# Patient Record
Sex: MALE | Employment: FULL TIME | URBAN - METROPOLITAN AREA
[De-identification: names, ages, dates, MRNs, and addresses within clinical notes are randomized per-mention and may not be internally consistent; named-entity substitution may affect disease eponyms.]

---

## 2024-07-22 ENCOUNTER — LAB (OUTPATIENT)
Dept: LAB | Facility: LAB | Age: 44
End: 2024-07-22
Payer: COMMERCIAL

## 2024-07-22 ENCOUNTER — APPOINTMENT (OUTPATIENT)
Dept: PRIMARY CARE | Facility: CLINIC | Age: 44
End: 2024-07-22
Payer: COMMERCIAL

## 2024-07-22 VITALS
BODY MASS INDEX: 28.28 KG/M2 | HEART RATE: 88 BPM | WEIGHT: 202 LBS | HEIGHT: 71 IN | SYSTOLIC BLOOD PRESSURE: 168 MMHG | RESPIRATION RATE: 18 BRPM | DIASTOLIC BLOOD PRESSURE: 110 MMHG | TEMPERATURE: 98.1 F

## 2024-07-22 DIAGNOSIS — I10 HTN (HYPERTENSION), BENIGN: Primary | ICD-10-CM

## 2024-07-22 DIAGNOSIS — I10 HTN (HYPERTENSION), BENIGN: ICD-10-CM

## 2024-07-22 DIAGNOSIS — R30.0 DYSURIA: ICD-10-CM

## 2024-07-22 LAB — TSH SERPL-ACNC: 1.99 MIU/L (ref 0.44–3.98)

## 2024-07-22 PROCEDURE — 99204 OFFICE O/P NEW MOD 45 MIN: CPT | Performed by: FAMILY MEDICINE

## 2024-07-22 PROCEDURE — 84443 ASSAY THYROID STIM HORMONE: CPT

## 2024-07-22 PROCEDURE — 1036F TOBACCO NON-USER: CPT | Performed by: FAMILY MEDICINE

## 2024-07-22 PROCEDURE — 3008F BODY MASS INDEX DOCD: CPT | Performed by: FAMILY MEDICINE

## 2024-07-22 PROCEDURE — 3080F DIAST BP >= 90 MM HG: CPT | Performed by: FAMILY MEDICINE

## 2024-07-22 PROCEDURE — 36415 COLL VENOUS BLD VENIPUNCTURE: CPT

## 2024-07-22 PROCEDURE — 3077F SYST BP >= 140 MM HG: CPT | Performed by: FAMILY MEDICINE

## 2024-07-22 PROCEDURE — 93000 ELECTROCARDIOGRAM COMPLETE: CPT | Performed by: FAMILY MEDICINE

## 2024-07-22 RX ORDER — FLUTICASONE PROPIONATE 50 MCG
1 SPRAY, SUSPENSION (ML) NASAL DAILY
COMMUNITY

## 2024-07-22 RX ORDER — METOPROLOL SUCCINATE 50 MG/1
50 TABLET, EXTENDED RELEASE ORAL DAILY
Qty: 30 TABLET | Refills: 11 | Status: SHIPPED | OUTPATIENT
Start: 2024-07-22

## 2024-07-22 RX ORDER — FEXOFENADINE HCL AND PSEUDOEPHEDRINE HCL 180; 240 MG/1; MG/1
1 TABLET, EXTENDED RELEASE ORAL DAILY
COMMUNITY

## 2024-07-22 NOTE — ASSESSMENT & PLAN NOTE
He is currently undergoing extensive workup for this through urology.  He has had multiple urinalyses and has not really shown signs of infection.  He had a urine cytology which was normal as well as a CT of the urinary system which was normal.  He is currently scheduled to have a cystoscopy with his urologist.

## 2024-07-22 NOTE — PROGRESS NOTES
Subjective   Nino Todd is a 43 y.o. male who presents for Establish Care and Hypertension.     HPI         Nino Todd is here for a hypertension follow-up:    Medication treatment:  not currently on medications for this problem  Recent BP readings:  mostly under 160/110  Symptoms: no cough, headache, blurred vision, chest pain, or shortness of breath, he does report intermittent dizziness.   He feels overheated often and likes the room cooler.  He sweats a lot and has lost weight unexpectedly recently. He does have palpitations especially at night.  One cup coffee daily  No energy drinks  No nicotine  Visit Vitals  BP (!) 168/110   Pulse 88   Temp 36.7 °C (98.1 °F)   Resp 18      Objective   Physical Exam  Constitutional:       Appearance: Normal appearance.   HENT:      Head: Normocephalic.   Eyes:      Conjunctiva/sclera: Conjunctivae normal.   Neck:      Thyroid: Thyromegaly (mild and symmetric) present.   Cardiovascular:      Rate and Rhythm: Normal rate and regular rhythm.      Heart sounds: Normal heart sounds.   Pulmonary:      Effort: Pulmonary effort is normal.      Breath sounds: Normal breath sounds.   Musculoskeletal:      Cervical back: Neck supple.   Skin:     General: Skin is warm and dry.   Neurological:      Mental Status: He is alert.            Assessment & Plan  HTN (hypertension), benign  This 43-year-old male has significantly elevated blood pressure with a diastolic that is significantly high as well.  He brings meticulous interval readings showing very consistently elevated blood pressure over the last week.  He is already had blood work done at another institution showing normal kidney function, blood counts.  He is not consuming any significant amount of caffeine and no nicotine.  He is not on any stimulating routine medications.  This point I think he does have the diagnosis of hypertension.  He has some significant symptoms of hyperthyroidism including a mildly prominent thyroid gland,  heat intolerance, sweats, and some palpitations, as well as weight loss.  We will check a thyroid screen to ensure that this is not part of the problem and go ahead and start him on a beta-blocker medication which would treat the symptoms as well as address the hypertension.  Orders:    TSH with reflex to Free T4 if abnormal; Future    ECG 12 Lead    metoprolol succinate XL (Toprol-XL) 50 mg 24 hr tablet; Take 1 tablet (50 mg) by mouth once daily. Do not crush or chew.    Follow Up In [facility] - PCP - Established; Future  Additionally he does describe some symptoms of feeling short attention span and a little hyper.  It is possible that he has undiagnosed attention deficit disorder but hyperthyroidism could also explain this and at any rate the beta-blocker may address the symptoms as well.  Dysuria  He is currently undergoing extensive workup for this through urology.  He has had multiple urinalyses and has not really shown signs of infection.  He had a urine cytology which was normal as well as a CT of the urinary system which was normal.  He is currently scheduled to have a cystoscopy with his urologist.              Please excuse any errors in grammar or translation related to this dictation. Voice recognition software was utilized to prepare this document.

## 2024-07-22 NOTE — ASSESSMENT & PLAN NOTE
This 43-year-old male has significantly elevated blood pressure with a diastolic that is significantly high as well.  He brings meticulous interval readings showing very consistently elevated blood pressure over the last week.  He is already had blood work done at another institution showing normal kidney function, blood counts.  He is not consuming any significant amount of caffeine and no nicotine.  He is not on any stimulating routine medications.  This point I think he does have the diagnosis of hypertension.  He has some significant symptoms of hyperthyroidism including a mildly prominent thyroid gland, heat intolerance, sweats, and some palpitations, as well as weight loss.  We will check a thyroid screen to ensure that this is not part of the problem and go ahead and start him on a beta-blocker medication which would treat the symptoms as well as address the hypertension.  Orders:    TSH with reflex to Free T4 if abnormal; Future    ECG 12 Lead    metoprolol succinate XL (Toprol-XL) 50 mg 24 hr tablet; Take 1 tablet (50 mg) by mouth once daily. Do not crush or chew.    Follow Up [facility] - PCP - Established; Future  Additionally he does describe some symptoms of feeling short attention span and a little hyper.  It is possible that he has undiagnosed attention deficit disorder but hyperthyroidism could also explain this and at any rate the beta-blocker may address the symptoms as well.

## 2024-08-26 ENCOUNTER — APPOINTMENT (OUTPATIENT)
Dept: PRIMARY CARE | Facility: CLINIC | Age: 44
End: 2024-08-26
Payer: COMMERCIAL

## 2024-08-26 VITALS
TEMPERATURE: 97.9 F | HEIGHT: 71 IN | HEART RATE: 64 BPM | WEIGHT: 212 LBS | DIASTOLIC BLOOD PRESSURE: 96 MMHG | SYSTOLIC BLOOD PRESSURE: 139 MMHG | BODY MASS INDEX: 29.68 KG/M2 | RESPIRATION RATE: 16 BRPM

## 2024-08-26 DIAGNOSIS — I10 HTN (HYPERTENSION), BENIGN: ICD-10-CM

## 2024-08-26 PROCEDURE — 3080F DIAST BP >= 90 MM HG: CPT | Performed by: FAMILY MEDICINE

## 2024-08-26 PROCEDURE — 1036F TOBACCO NON-USER: CPT | Performed by: FAMILY MEDICINE

## 2024-08-26 PROCEDURE — 99214 OFFICE O/P EST MOD 30 MIN: CPT | Performed by: FAMILY MEDICINE

## 2024-08-26 PROCEDURE — 3008F BODY MASS INDEX DOCD: CPT | Performed by: FAMILY MEDICINE

## 2024-08-26 PROCEDURE — 3075F SYST BP GE 130 - 139MM HG: CPT | Performed by: FAMILY MEDICINE

## 2024-08-26 RX ORDER — METOPROLOL SUCCINATE 100 MG/1
100 TABLET, EXTENDED RELEASE ORAL DAILY
Qty: 90 TABLET | Refills: 3 | Status: SHIPPED | OUTPATIENT
Start: 2024-08-26

## 2024-08-26 NOTE — ASSESSMENT & PLAN NOTE
Blood pressure is significantly improved after starting Toprol and losing some weight.  He has dropped about 20 points on the systolic but is still right at 140 and the diastolic is still slightly above goal.  I would like to get him below goal to completely eliminate the increased risk of cardiovascular disease.  We will increase metoprolol from 50 mg to 100 mg daily.  He will continue to monitor his blood pressure daily and let me know if he develops any dizziness or lightheaded spells.  I will follow-up in 3 months to make sure that he is in the goal range  Orders:    Follow Up [facility] - PCP - Established    metoprolol succinate XL (Toprol-XL) 100 mg 24 hr tablet; Take 1 tablet (100 mg) by mouth once daily. Do not crush or chew.    Follow Up [facility] - PCP - Established; Future

## 2024-08-26 NOTE — PROGRESS NOTES
Subjective   Nino Todd is a 43 y.o. male who presents for Hypertension.     HPI         Nino Todd is here for a hypertension follow-up:    Medication treatment:  taking as prescribed  Recent BP readings: did not bring log Reports 140s/90's.  Symptoms: no cough, headache, blurred vision, chest pain, or shortness of breath   Visit Vitals  BP (!) 139/96   Pulse 64   Temp 36.6 °C (97.9 °F)   Resp 16      Objective   Physical Exam  Constitutional:       Appearance: Normal appearance.   HENT:      Head: Normocephalic.   Eyes:      Conjunctiva/sclera: Conjunctivae normal.   Cardiovascular:      Rate and Rhythm: Normal rate and regular rhythm.      Heart sounds: Normal heart sounds.   Pulmonary:      Effort: Pulmonary effort is normal.      Breath sounds: Normal breath sounds.   Musculoskeletal:      Cervical back: Neck supple.   Skin:     General: Skin is warm and dry.   Neurological:      Mental Status: He is alert.            Assessment & Plan  HTN (hypertension), benign  Blood pressure is significantly improved after starting Toprol and losing some weight.  He has dropped about 20 points on the systolic but is still right at 140 and the diastolic is still slightly above goal.  I would like to get him below goal to completely eliminate the increased risk of cardiovascular disease.  We will increase metoprolol from 50 mg to 100 mg daily.  He will continue to monitor his blood pressure daily and let me know if he develops any dizziness or lightheaded spells.  I will follow-up in 3 months to make sure that he is in the goal range  Orders:    Follow Up [facility] - PCP - Established    metoprolol succinate XL (Toprol-XL) 100 mg 24 hr tablet; Take 1 tablet (100 mg) by mouth once daily. Do not crush or chew.    Follow Up [facility] - PCP - Established; Future           Please excuse any errors in grammar or translation related to this dictation. Voice recognition software was utilized to prepare  this document.

## 2024-10-09 ENCOUNTER — APPOINTMENT (OUTPATIENT)
Dept: PRIMARY CARE | Facility: CLINIC | Age: 44
End: 2024-10-09
Payer: COMMERCIAL

## 2024-11-29 ENCOUNTER — APPOINTMENT (OUTPATIENT)
Dept: PRIMARY CARE | Facility: CLINIC | Age: 44
End: 2024-11-29
Payer: COMMERCIAL

## 2025-04-11 ENCOUNTER — APPOINTMENT (OUTPATIENT)
Dept: PRIMARY CARE | Facility: CLINIC | Age: 45
End: 2025-04-11
Payer: COMMERCIAL

## 2025-04-11 VITALS
SYSTOLIC BLOOD PRESSURE: 108 MMHG | WEIGHT: 225 LBS | RESPIRATION RATE: 16 BRPM | DIASTOLIC BLOOD PRESSURE: 74 MMHG | BODY MASS INDEX: 31.5 KG/M2 | HEIGHT: 71 IN | TEMPERATURE: 97.9 F | HEART RATE: 66 BPM

## 2025-04-11 DIAGNOSIS — Z11.59 ENCOUNTER FOR HEPATITIS C SCREENING TEST FOR LOW RISK PATIENT: ICD-10-CM

## 2025-04-11 DIAGNOSIS — Z13.220 SCREENING, LIPID: ICD-10-CM

## 2025-04-11 DIAGNOSIS — R41.840 CONCENTRATION DEFICIT: ICD-10-CM

## 2025-04-11 DIAGNOSIS — I10 HTN (HYPERTENSION), BENIGN: Primary | ICD-10-CM

## 2025-04-11 DIAGNOSIS — Z00.00 WELL ADULT EXAM: ICD-10-CM

## 2025-04-11 PROBLEM — R30.0 DYSURIA: Status: RESOLVED | Noted: 2024-07-22 | Resolved: 2025-04-11

## 2025-04-11 NOTE — ASSESSMENT & PLAN NOTE
Blood pressure is significantly improved with the increased dose of metoprolol.  He is getting some mild hypotension and occasionally has some dizziness and lightheadedness when he stands up rapidly but this is very tolerable.  We discussed the benefit versus risk of heart protection versus hypotension and he feels that he would like to continue with the current dose.  We will order his annual screening labs and check his liver and kidney function as well.  Orders:    Comprehensive Metabolic Panel; Future    CBC; Future    Hepatitis C Antibody; Future    Lipid Panel; Future

## 2025-04-11 NOTE — PROGRESS NOTES
Subjective   Nino Todd is a 44 y.o. male who presents for Hypertension.     HPI         Nino Todd is here for a hypertension follow-up:    Medication treatment:  taking as prescribed  Recent BP readings:  120s/80s  Symptoms: no cough, headache, blurred vision, chest pain, or shortness of breath     Visit Vitals  /74   Pulse 66   Temp 36.6 °C (97.9 °F)   Resp 16      Objective   Physical Exam  Constitutional:       Appearance: Normal appearance.   HENT:      Head: Normocephalic.   Eyes:      Conjunctiva/sclera: Conjunctivae normal.   Cardiovascular:      Rate and Rhythm: Normal rate and regular rhythm.      Heart sounds: Normal heart sounds.   Pulmonary:      Effort: Pulmonary effort is normal.      Breath sounds: Normal breath sounds.   Musculoskeletal:      Cervical back: Neck supple.   Skin:     General: Skin is warm and dry.   Neurological:      Mental Status: He is alert.       No data recorded     No data recorded   No data recorded   Assessment & Plan  HTN (hypertension), benign  Blood pressure is significantly improved with the increased dose of metoprolol.  He is getting some mild hypotension and occasionally has some dizziness and lightheadedness when he stands up rapidly but this is very tolerable.  We discussed the benefit versus risk of heart protection versus hypotension and he feels that he would like to continue with the current dose.  We will order his annual screening labs and check his liver and kidney function as well.  Orders:    Comprehensive Metabolic Panel; Future    CBC; Future    Hepatitis C Antibody; Future    Lipid Panel; Future    Encounter for hepatitis C screening test for low risk patient    Orders:    Hepatitis C Antibody; Future    Screening, lipid    Orders:    Lipid Panel; Future    Concentration deficit  He was never diagnosed with attention deficit as a child but feels that he does have difficulty concentrating.  He notes that his current job requires a much higher  degree of concentration he feels he is having trouble.  Since it is more difficult to make the diagnosis initially in an adult I would like the expert opinion of a neurology specialist to help with this diagnosis and will make a referral  Orders:    Referral to Neurology; Future    Well adult exam    Orders:    Follow Up [facility] - PCP; Future           Please excuse any errors in grammar or translation related to this dictation. Voice recognition software was utilized to prepare this document.

## 2025-04-19 LAB
ALBUMIN SERPL-MCNC: 4.5 G/DL (ref 3.6–5.1)
ALP SERPL-CCNC: 67 U/L (ref 36–130)
ALT SERPL-CCNC: 43 U/L (ref 9–46)
ANION GAP SERPL CALCULATED.4IONS-SCNC: 7 MMOL/L (CALC) (ref 7–17)
AST SERPL-CCNC: 26 U/L (ref 10–40)
BILIRUB SERPL-MCNC: 0.4 MG/DL (ref 0.2–1.2)
BUN SERPL-MCNC: 19 MG/DL (ref 7–25)
CALCIUM SERPL-MCNC: 9.1 MG/DL (ref 8.6–10.3)
CHLORIDE SERPL-SCNC: 104 MMOL/L (ref 98–110)
CHOLEST SERPL-MCNC: 211 MG/DL
CHOLEST/HDLC SERPL: 7.5 (CALC)
CO2 SERPL-SCNC: 28 MMOL/L (ref 20–32)
CREAT SERPL-MCNC: 0.94 MG/DL (ref 0.6–1.29)
EGFRCR SERPLBLD CKD-EPI 2021: 102 ML/MIN/1.73M2
ERYTHROCYTE [DISTWIDTH] IN BLOOD BY AUTOMATED COUNT: 13 % (ref 11–15)
GLUCOSE SERPL-MCNC: 86 MG/DL (ref 65–99)
HCT VFR BLD AUTO: 49.1 % (ref 38.5–50)
HCV AB SERPL QL IA: NORMAL
HDLC SERPL-MCNC: 28 MG/DL
HGB BLD-MCNC: 16.3 G/DL (ref 13.2–17.1)
LDLC SERPL CALC-MCNC: ABNORMAL MG/DL
MCH RBC QN AUTO: 28.7 PG (ref 27–33)
MCHC RBC AUTO-ENTMCNC: 33.2 G/DL (ref 32–36)
MCV RBC AUTO: 86.4 FL (ref 80–100)
NONHDLC SERPL-MCNC: 183 MG/DL (CALC)
PLATELET # BLD AUTO: 239 THOUSAND/UL (ref 140–400)
PMV BLD REES-ECKER: 10.1 FL (ref 7.5–12.5)
POTASSIUM SERPL-SCNC: 4.3 MMOL/L (ref 3.5–5.3)
PROT SERPL-MCNC: 7.2 G/DL (ref 6.1–8.1)
RBC # BLD AUTO: 5.68 MILLION/UL (ref 4.2–5.8)
SODIUM SERPL-SCNC: 139 MMOL/L (ref 135–146)
TRIGL SERPL-MCNC: 416 MG/DL
WBC # BLD AUTO: 7.6 THOUSAND/UL (ref 3.8–10.8)

## 2025-05-29 ENCOUNTER — APPOINTMENT (OUTPATIENT)
Dept: NEUROLOGY | Facility: CLINIC | Age: 45
End: 2025-05-29
Payer: COMMERCIAL

## 2025-05-29 VITALS
SYSTOLIC BLOOD PRESSURE: 144 MMHG | HEART RATE: 95 BPM | BODY MASS INDEX: 30.91 KG/M2 | HEIGHT: 72 IN | DIASTOLIC BLOOD PRESSURE: 94 MMHG | WEIGHT: 228.2 LBS

## 2025-05-29 DIAGNOSIS — F90.9 ATTENTION DEFICIT HYPERACTIVITY DISORDER (ADHD), UNSPECIFIED ADHD TYPE: ICD-10-CM

## 2025-05-29 DIAGNOSIS — G93.40 ENCEPHALOPATHY, UNSPECIFIED TYPE: Primary | ICD-10-CM

## 2025-05-29 PROCEDURE — 3008F BODY MASS INDEX DOCD: CPT | Performed by: PSYCHIATRY & NEUROLOGY

## 2025-05-29 PROCEDURE — 3080F DIAST BP >= 90 MM HG: CPT | Performed by: PSYCHIATRY & NEUROLOGY

## 2025-05-29 PROCEDURE — 99205 OFFICE O/P NEW HI 60 MIN: CPT | Performed by: PSYCHIATRY & NEUROLOGY

## 2025-05-29 PROCEDURE — 3077F SYST BP >= 140 MM HG: CPT | Performed by: PSYCHIATRY & NEUROLOGY

## 2025-05-29 RX ORDER — DEXMETHYLPHENIDATE HYDROCHLORIDE 5 MG/1
5 TABLET ORAL DAILY
Qty: 30 TABLET | Refills: 0 | Status: SHIPPED | OUTPATIENT
Start: 2025-06-28 | End: 2025-07-28

## 2025-05-29 RX ORDER — DEXMETHYLPHENIDATE HYDROCHLORIDE 5 MG/1
5 TABLET ORAL DAILY
Qty: 30 TABLET | Refills: 0 | Status: SHIPPED | OUTPATIENT
Start: 2025-05-29 | End: 2025-06-28

## 2025-05-29 RX ORDER — DEXMETHYLPHENIDATE HYDROCHLORIDE 10 MG/1
10 CAPSULE, EXTENDED RELEASE ORAL DAILY
Qty: 30 CAPSULE | Refills: 0 | Status: SHIPPED | OUTPATIENT
Start: 2025-05-29 | End: 2025-06-28

## 2025-05-29 RX ORDER — DEXMETHYLPHENIDATE HYDROCHLORIDE 10 MG/1
10 CAPSULE, EXTENDED RELEASE ORAL DAILY
Qty: 30 CAPSULE | Refills: 0 | Status: SHIPPED | OUTPATIENT
Start: 2025-06-28 | End: 2025-07-28

## 2025-05-29 ASSESSMENT — ENCOUNTER SYMPTOMS
JOINT SWELLING: 0
NECK STIFFNESS: 0
VOMITING: 0
DIFFICULTY URINATING: 0
WEAKNESS: 0
COUGH: 0
FREQUENCY: 0
AGITATION: 0
HALLUCINATIONS: 0
OCCASIONAL FEELINGS OF UNSTEADINESS: 0
FATIGUE: 0
FACIAL ASYMMETRY: 0
BACK PAIN: 0
LOSS OF SENSATION IN FEET: 0
FEVER: 0
DEPRESSION: 0
WHEEZING: 0
PHOTOPHOBIA: 0
NERVOUS/ANXIOUS: 1
SEIZURES: 0
DECREASED CONCENTRATION: 1
HYPERACTIVE: 0
SLEEP DISTURBANCE: 0
ABDOMINAL PAIN: 0
CONFUSION: 0
NAUSEA: 0
ADENOPATHY: 0
SHORTNESS OF BREATH: 0
TROUBLE SWALLOWING: 0
ARTHRALGIAS: 0
UNEXPECTED WEIGHT CHANGE: 0
BRUISES/BLEEDS EASILY: 0
LIGHT-HEADEDNESS: 0
DIZZINESS: 0
EYE PAIN: 0
SINUS PRESSURE: 0
NECK PAIN: 0
PALPITATIONS: 0
TREMORS: 0
HEADACHES: 0
SPEECH DIFFICULTY: 0
NUMBNESS: 0

## 2025-05-29 ASSESSMENT — PATIENT HEALTH QUESTIONNAIRE - PHQ9
SUM OF ALL RESPONSES TO PHQ9 QUESTIONS 1 & 2: 0
1. LITTLE INTEREST OR PLEASURE IN DOING THINGS: NOT AT ALL
2. FEELING DOWN, DEPRESSED OR HOPELESS: NOT AT ALL

## 2025-05-29 NOTE — PROGRESS NOTES
Nino Todd  44 y.o.       SUBJECTIVE  Nino is a 44-year-old young man who was seen today for evaluation of his possible attention deficit disorder difficulty at work and at home.  He had this problem since early childhood but was never diagnosed always had problem finishing his project paying attention and interrupting other people.  Also he feels like his brain is running at the million miles an hour.  Today's physical and neurological is normal based on the diagnostic checklist suggestive of inattentive type of ADHD with some symptoms of hyperactivity.  Since the condition is bothersome and is affecting him socially as well as functionally I would like to try him on Focalin XR 10 mg daily and use 5 in the afternoon as needed and see him back in 4 to 6 weeks and depending on how he does might adjust his dose of later date    I have discussed the controlled substance policy, abuse potential, risk benefit and the precautions to be taken which she understood    As you recall, Nino is a 44-year-old young man who has been having difficulty since her discharge but according to him he is having a hard time paying attention concentrating and getting his work done.  On the diagnostic checklist she had 8 out of 9 for inattentive and 4 of 9 for Hyper-Tet type of ADHD    At present he is working full-time and is having hard time with work as well as at home    He did go to college and finished 1 semester and then dropped out    He lives with his wife and has son from previous marriages also having symptoms but never diagnosed.  He is not sure about his side of the family but mom did not feel to finish 12th grade and got GED and dad had some college degree    No history of smoking alcohol and substance abuse    His birth developmental history's were unremarkable.      Due to technical limitations of voice recognition and human error, this note may not accurately reflect the care of the patient.    Review of Systems    Constitutional:  Negative for fatigue, fever and unexpected weight change.   HENT:  Negative for dental problem, ear pain, hearing loss, sinus pressure, tinnitus and trouble swallowing.    Eyes:  Negative for photophobia, pain and visual disturbance.   Respiratory:  Negative for cough, shortness of breath and wheezing.    Cardiovascular:  Negative for chest pain, palpitations and leg swelling.   Gastrointestinal:  Negative for abdominal pain, nausea and vomiting.   Genitourinary:  Negative for difficulty urinating, enuresis and frequency.   Musculoskeletal:  Negative for arthralgias, back pain, joint swelling, neck pain and neck stiffness.   Skin:  Negative for pallor and rash.   Allergic/Immunologic: Negative for food allergies.   Neurological:  Negative for dizziness, tremors, seizures, syncope, facial asymmetry, speech difficulty, weakness, light-headedness, numbness and headaches.   Hematological:  Negative for adenopathy. Does not bruise/bleed easily.   Psychiatric/Behavioral:  Positive for decreased concentration. Negative for agitation, behavioral problems, confusion, hallucinations and sleep disturbance. The patient is nervous/anxious. The patient is not hyperactive.         Problem List[1]  Medical History[2]  Surgical History[3]    reports that he has been smoking cigarettes. He has never used smokeless tobacco. He reports that he does not currently use alcohol. He reports that he does not use drugs.    BP (!) 144/94 (BP Location: Left arm, Patient Position: Sitting, BP Cuff Size: Large adult)   Pulse 95   Ht 1.829 m (6')   Wt 104 kg (228 lb 3.2 oz)   BMI 30.95 kg/m²     OBJECTIVE  Physical Exam/Neurological Exam   Constitutional: General appearance: no acute distress   Auscultation of Heart: Regular rate and rhythm, no murmurs, normal S1 and S2.   Carotid Arteries: Intact without any bruits.   Neck is supple.   No lymph adenopathy.   Peripheral Vascular Exam: Pulses +2 and equal in all extremities.  No swelling, varicosities, edema or tenderness to palpations.    Abdomen is soft, nondistended. No organomegaly.  Mental status: The patient was in no distress, alert, interactive and cooperative. Affect is appropriate.   Orientation: oriented to person, oriented to place and oriented to time.   Memory: recent memory intact and remote memory intact.   Attention: normal attention span and normal concentrating ability.   Language: normal comprehension and no difficulty naming common objects.   Fund of knowledge: Patient displays adequate knowledge of current events, adequate fund of knowledge regarding past history and adequate fund of knowledge regarding vocabulary.   Eyes: The ophthalmoscopic examination was normal. The fundi are visualized with normal disc margins and without.  Cranial nerve II: Visual fields full to confrontation.   Cranial nerves III, IV, and VI: Pupils round, equally reactive to light; no ptosis. EOMs intact. No nystagmus.   Cranial Nerve V: Facial sensation intact bilaterally.   Cranial nerve VII: Normal and symmetric facial strength.   Cranial nerve VIII: Hearing is intact bilaterally to finger rub / whisper.   Cranial nerves IX and X: Palate elevates symmetrically.   Cranial nerve XI: Shoulder shrug and neck rotation strength are intact.   Cranial nerve XII: Tongue midline with normal strength.   Motor: Motor exam was normal. Muscle bulk was normal in both upper and lower extremities. Muscle tone was normal in both upper and lower extremities. Muscle strength was 5/5 throughout. no abnormal or adventitious movements were present.   Deep Tendon Reflexes: left biceps 2+ , right biceps 2+, left triceps 2+, right triceps 2+, left brachioradialis 2+, right brachioradialis 2+, left patella 2+, right patella 2+, left ankle jerk 2+, right ankle jerk 2+   Plantar Reflex: Toes downgoing to plantar stimulation on the left. Toes downgoing to plantar stimulation on the right.   Sensory Exam: Normal to  light touch.   Coordination: There is no limb dystaxia and rapid alternating movements are intact.  Gait: Gait is normal without spasticity, ataxia or bradykinesia. Stance is stable with a negative Romberg.      ASSESSMENT/PLAN  Diagnoses and all orders for this visit:  Encephalopathy, unspecified type  Attention deficit hyperactivity disorder (ADHD), unspecified ADHD type  -     Drug Screen, Urine With Reflex to Confirmation  -     dexmethylphenidate XR (Focalin XR) 10 mg 24 hr capsule; Take 1 capsule (10 mg) by mouth once daily. Do not crush, chew, or split.  -     dexmethylphenidate XR (Focalin XR) 10 mg 24 hr capsule; Take 1 capsule (10 mg) by mouth once daily. Do not crush, chew, or split. Do not fill before June 28, 2025.  -     dexmethylphenidate (Focalin) 5 mg tablet; Take 1 tablet (5 mg) by mouth once daily.  -     dexmethylphenidate (Focalin) 5 mg tablet; Take 1 tablet (5 mg) by mouth once daily. Do not fill before June 28, 2025.        Alan Carlin MD  5/29/2025  12:39 PM       [1]   Patient Active Problem List  Diagnosis    HTN (hypertension), benign   [2] History reviewed. No pertinent past medical history.  [3] History reviewed. No pertinent surgical history.

## 2025-05-30 LAB
AMPHETAMINES UR QL: NEGATIVE NG/ML
BARBITURATES UR QL: NEGATIVE NG/ML
BENZODIAZ UR QL: NEGATIVE NG/ML
BZE UR QL: NEGATIVE NG/ML
CREAT UR-MCNC: 170.3 MG/DL
FENTANYL UR QL SCN: NEGATIVE NG/ML
METHADONE UR QL: NEGATIVE NG/ML
OPIATES UR QL: NEGATIVE NG/ML
OXIDANTS UR QL: NEGATIVE MCG/ML
OXYCODONE UR QL: NEGATIVE NG/ML
PCP UR QL: NEGATIVE NG/ML
PH UR: 7.1 [PH] (ref 4.5–9)
QUEST NOTES AND COMMENTS: NORMAL
THC UR QL: NEGATIVE NG/ML

## 2025-07-10 ENCOUNTER — APPOINTMENT (OUTPATIENT)
Dept: NEUROLOGY | Facility: CLINIC | Age: 45
End: 2025-07-10
Payer: COMMERCIAL

## 2026-04-15 ENCOUNTER — APPOINTMENT (OUTPATIENT)
Dept: PRIMARY CARE | Facility: CLINIC | Age: 46
End: 2026-04-15
Payer: COMMERCIAL